# Patient Record
Sex: MALE | Race: WHITE | NOT HISPANIC OR LATINO | Employment: UNEMPLOYED | URBAN - METROPOLITAN AREA
[De-identification: names, ages, dates, MRNs, and addresses within clinical notes are randomized per-mention and may not be internally consistent; named-entity substitution may affect disease eponyms.]

---

## 2021-08-13 ENCOUNTER — IMMUNIZATION (OUTPATIENT)
Dept: INTERNAL MEDICINE | Facility: CLINIC | Age: 26
End: 2021-08-13
Payer: OTHER GOVERNMENT

## 2021-08-13 DIAGNOSIS — Z23 NEED FOR VACCINATION: Primary | ICD-10-CM

## 2021-08-13 PROCEDURE — 91300 COVID-19, MRNA, LNP-S, PF, 30 MCG/0.3 ML DOSE VACCINE: ICD-10-PCS | Mod: ,,, | Performed by: INTERNAL MEDICINE

## 2021-08-13 PROCEDURE — 91300 COVID-19, MRNA, LNP-S, PF, 30 MCG/0.3 ML DOSE VACCINE: CPT | Mod: ,,, | Performed by: INTERNAL MEDICINE

## 2021-08-13 PROCEDURE — 0001A COVID-19, MRNA, LNP-S, PF, 30 MCG/0.3 ML DOSE VACCINE: ICD-10-PCS | Mod: CV19,,, | Performed by: INTERNAL MEDICINE

## 2021-08-13 PROCEDURE — 0001A COVID-19, MRNA, LNP-S, PF, 30 MCG/0.3 ML DOSE VACCINE: CPT | Mod: CV19,,, | Performed by: INTERNAL MEDICINE

## 2021-09-10 ENCOUNTER — IMMUNIZATION (OUTPATIENT)
Dept: PRIMARY CARE CLINIC | Facility: CLINIC | Age: 26
End: 2021-09-10

## 2021-09-10 DIAGNOSIS — Z23 NEED FOR VACCINATION: Primary | ICD-10-CM

## 2021-09-23 ENCOUNTER — OFFICE VISIT (OUTPATIENT)
Dept: INTERNAL MEDICINE | Facility: CLINIC | Age: 26
End: 2021-09-23
Payer: COMMERCIAL

## 2021-09-23 DIAGNOSIS — M26.609 TMJ (TEMPOROMANDIBULAR JOINT DISORDER): Primary | ICD-10-CM

## 2021-09-23 PROCEDURE — 99203 OFFICE O/P NEW LOW 30 MIN: CPT | Mod: 95,,, | Performed by: NURSE PRACTITIONER

## 2021-09-23 PROCEDURE — 99203 PR OFFICE/OUTPT VISIT, NEW, LEVL III, 30-44 MIN: ICD-10-PCS | Mod: 95,,, | Performed by: NURSE PRACTITIONER

## 2021-09-23 RX ORDER — METHYLPREDNISOLONE 4 MG/1
TABLET ORAL
Qty: 1 PACKAGE | Refills: 0 | Status: SHIPPED | OUTPATIENT
Start: 2021-09-23 | End: 2021-10-14

## 2021-10-01 ENCOUNTER — OFFICE VISIT (OUTPATIENT)
Dept: PRIMARY CARE CLINIC | Facility: CLINIC | Age: 26
End: 2021-10-01
Payer: COMMERCIAL

## 2021-10-01 DIAGNOSIS — S03.00XD DISLOCATION OF TEMPOROMANDIBULAR JOINT, SUBSEQUENT ENCOUNTER: Primary | ICD-10-CM

## 2021-10-01 PROBLEM — G44.201: Status: ACTIVE | Noted: 2021-10-01

## 2021-10-01 PROBLEM — G47.30 SLEEP APNEA: Status: ACTIVE | Noted: 2021-03-03

## 2021-10-01 PROBLEM — J30.9 ALLERGIC RHINITIS: Status: ACTIVE | Noted: 2021-10-01

## 2021-10-01 PROBLEM — R51.9 HEADACHE: Status: ACTIVE | Noted: 2021-10-01

## 2021-10-01 PROCEDURE — 99213 OFFICE O/P EST LOW 20 MIN: CPT | Mod: 95,,, | Performed by: STUDENT IN AN ORGANIZED HEALTH CARE EDUCATION/TRAINING PROGRAM

## 2021-10-01 PROCEDURE — 99213 PR OFFICE/OUTPT VISIT, EST, LEVL III, 20-29 MIN: ICD-10-PCS | Mod: 95,,, | Performed by: STUDENT IN AN ORGANIZED HEALTH CARE EDUCATION/TRAINING PROGRAM

## 2021-10-01 RX ORDER — CYCLOBENZAPRINE HCL 10 MG
10 TABLET ORAL NIGHTLY
Qty: 14 TABLET | Refills: 0 | Status: SHIPPED | OUTPATIENT
Start: 2021-10-01 | End: 2022-05-11

## 2021-10-01 RX ORDER — FLAXSEED OIL 1000 MG
1 CAPSULE ORAL DAILY
COMMUNITY

## 2021-10-01 RX ORDER — NAPROXEN 500 MG/1
500 TABLET ORAL 2 TIMES DAILY
Qty: 28 TABLET | Refills: 0 | Status: SHIPPED | OUTPATIENT
Start: 2021-10-01

## 2021-10-01 RX ORDER — LANOLIN ALCOHOL/MO/W.PET/CERES
1 CREAM (GRAM) TOPICAL DAILY
COMMUNITY
Start: 2020-12-28 | End: 2021-12-28

## 2021-12-28 ENCOUNTER — TELEPHONE (OUTPATIENT)
Dept: PRIMARY CARE CLINIC | Facility: CLINIC | Age: 26
End: 2021-12-28
Payer: COMMERCIAL

## 2021-12-28 NOTE — TELEPHONE ENCOUNTER
----- Message from Faith Sam sent at 12/28/2021 10:55 AM CST -----  Contact: [phone] Clay  Patient tested positive for covid on Monday and would like to know the next steps. Patient is experiencing symptoms. Please call and advise.

## 2021-12-28 NOTE — TELEPHONE ENCOUNTER
Pt was having headaches, leg cramps, and lower back pain, nausea, and vomiting. States today he is feeling a lot better, no headaches, but still having body aches. States he has been able to hold liquids down and food. No SOB. Pt instructed to rest, drink fluids, and take OTC medications for symptoms. Pt instructed to go to ER for new or worsening symptoms, stated understanding

## 2022-03-10 ENCOUNTER — TELEPHONE (OUTPATIENT)
Dept: PRIMARY CARE CLINIC | Facility: CLINIC | Age: 27
End: 2022-03-10
Payer: COMMERCIAL

## 2022-03-18 ENCOUNTER — IMMUNIZATION (OUTPATIENT)
Dept: INTERNAL MEDICINE | Facility: CLINIC | Age: 27
End: 2022-03-18
Payer: COMMERCIAL

## 2022-03-18 DIAGNOSIS — Z23 NEED FOR VACCINATION: Primary | ICD-10-CM

## 2022-03-18 PROCEDURE — 91305 COVID-19, MRNA, LNP-S, PF, 30 MCG/0.3 ML DOSE VACCINE (PFIZER): CPT | Mod: PBBFAC

## 2022-04-22 ENCOUNTER — OFFICE VISIT (OUTPATIENT)
Dept: PRIMARY CARE CLINIC | Facility: CLINIC | Age: 27
End: 2022-04-22
Payer: COMMERCIAL

## 2022-04-22 DIAGNOSIS — Z91.199 NO-SHOW FOR APPOINTMENT: Primary | ICD-10-CM

## 2022-04-22 PROCEDURE — 99499 UNLISTED E&M SERVICE: CPT | Mod: 95,,, | Performed by: STUDENT IN AN ORGANIZED HEALTH CARE EDUCATION/TRAINING PROGRAM

## 2022-04-22 PROCEDURE — 99499 NO LOS: ICD-10-PCS | Mod: 95,,, | Performed by: STUDENT IN AN ORGANIZED HEALTH CARE EDUCATION/TRAINING PROGRAM

## 2022-04-22 NOTE — PROGRESS NOTES
Appears patient logged into appt 1 hour late.  (checked in at 5:30 for a 4:30 appt)    Patient was not seen for his appt and provider was done seeing patients for the day.    - SB

## 2022-04-29 ENCOUNTER — OFFICE VISIT (OUTPATIENT)
Dept: PRIMARY CARE CLINIC | Facility: CLINIC | Age: 27
End: 2022-04-29
Payer: COMMERCIAL

## 2022-04-29 DIAGNOSIS — J34.2 NASAL SEPTAL DEVIATION: ICD-10-CM

## 2022-04-29 DIAGNOSIS — R09.81 NOSE CONGESTION: Primary | ICD-10-CM

## 2022-04-29 PROCEDURE — 99213 OFFICE O/P EST LOW 20 MIN: CPT | Mod: 95,,, | Performed by: STUDENT IN AN ORGANIZED HEALTH CARE EDUCATION/TRAINING PROGRAM

## 2022-04-29 PROCEDURE — 99213 PR OFFICE/OUTPT VISIT, EST, LEVL III, 20-29 MIN: ICD-10-PCS | Mod: 95,,, | Performed by: STUDENT IN AN ORGANIZED HEALTH CARE EDUCATION/TRAINING PROGRAM

## 2022-04-29 RX ORDER — LANOLIN ALCOHOL/MO/W.PET/CERES
2 CREAM (GRAM) TOPICAL DAILY
COMMUNITY
Start: 2021-05-28

## 2022-04-29 RX ORDER — LORATADINE 10 MG/1
TABLET ORAL
COMMUNITY
Start: 2021-08-02

## 2022-04-29 NOTE — PROGRESS NOTES
The patient location is: [place]  The chief complaint leading to consultation is: sinusitis    Visit type: audiovisual    Face to Face time with patient: 15  15 minutes of total time spent on the encounter, which includes face to face time and non-face to face time preparing to see the patient (eg, review of tests), Obtaining and/or reviewing separately obtained history, Documenting clinical information in the electronic or other health record, Independently interpreting results (not separately reported) and communicating results to the patient/family/caregiver, or Care coordination (not separately reported).         Each patient to whom he or she provides medical services by telemedicine is:  (1) informed of the relationship between the physician and patient and the respective role of any other health care provider with respect to management of the patient; and (2) notified that he or she may decline to receive medical services by telemedicine and may withdraw from such care at any time.    Notes:     Has been having significant allergies, is interested in a referral to ENT.  Has tried multiple anti-histamines, nasal rinses and Flonase.  Not getting relief.    Right nare more obstructed, cannot even irrigate that side.  States has been working in [place] and [place] and both areas having high pollen that is affecting him.      Has a CPAP but is not able to tolerate this due to congestion.     No fever or chills. No cough.  No mucous.  No N/V.  No ear pain.     Physical Exam  Constitutional:       General: He is not in acute distress.     Appearance: Normal appearance. He is not ill-appearing.   HENT:      Head: Normocephalic and atraumatic.      Right Ear: External ear normal.      Left Ear: External ear normal.   Eyes:      Extraocular Movements: Extraocular movements intact.   Pulmonary:      Effort: No respiratory distress.   Neurological:      Mental Status: He is alert and oriented to person, place, and time.    Psychiatric:         Mood and Affect: Mood normal.         Behavior: Behavior normal.         Right nostril obstruction, congestion:   - continue with anti-histamine and irrigation.   - referral to ENT being placed to eval and consider if needing further intervention.

## 2022-05-06 ENCOUNTER — OFFICE VISIT (OUTPATIENT)
Dept: OTOLARYNGOLOGY | Facility: CLINIC | Age: 27
End: 2022-05-06
Payer: COMMERCIAL

## 2022-05-06 VITALS — TEMPERATURE: 98 F | HEART RATE: 76 BPM | SYSTOLIC BLOOD PRESSURE: 137 MMHG | DIASTOLIC BLOOD PRESSURE: 89 MMHG

## 2022-05-06 DIAGNOSIS — J34.2 NASAL SEPTAL DEVIATION: ICD-10-CM

## 2022-05-06 DIAGNOSIS — J32.9 CHRONIC SINUSITIS, UNSPECIFIED LOCATION: ICD-10-CM

## 2022-05-06 DIAGNOSIS — M95.0 NASAL VALVE COLLAPSE: ICD-10-CM

## 2022-05-06 DIAGNOSIS — G47.33 OSA (OBSTRUCTIVE SLEEP APNEA): ICD-10-CM

## 2022-05-06 DIAGNOSIS — J30.89 NON-SEASONAL ALLERGIC RHINITIS, UNSPECIFIED TRIGGER: Primary | ICD-10-CM

## 2022-05-06 DIAGNOSIS — R09.81 NOSE CONGESTION: ICD-10-CM

## 2022-05-06 DIAGNOSIS — J30.89 NON-SEASONAL ALLERGIC RHINITIS, UNSPECIFIED TRIGGER: ICD-10-CM

## 2022-05-06 DIAGNOSIS — J32.9 CHRONIC SINUSITIS, UNSPECIFIED LOCATION: Primary | ICD-10-CM

## 2022-05-06 PROCEDURE — 99204 OFFICE O/P NEW MOD 45 MIN: CPT | Mod: 25,S$GLB,, | Performed by: OTOLARYNGOLOGY

## 2022-05-06 PROCEDURE — 31231 NASAL ENDOSCOPY DX: CPT | Mod: S$GLB,,, | Performed by: OTOLARYNGOLOGY

## 2022-05-06 PROCEDURE — 31231 PR NASAL ENDOSCOPY, DX: ICD-10-PCS | Mod: S$GLB,,, | Performed by: OTOLARYNGOLOGY

## 2022-05-06 PROCEDURE — 99204 PR OFFICE/OUTPT VISIT, NEW, LEVL IV, 45-59 MIN: ICD-10-PCS | Mod: 25,S$GLB,, | Performed by: OTOLARYNGOLOGY

## 2022-05-06 RX ORDER — FLUTICASONE PROPIONATE 50 MCG
1 SPRAY, SUSPENSION (ML) NASAL 2 TIMES DAILY
Qty: 16 G | Refills: 11 | Status: SHIPPED | OUTPATIENT
Start: 2022-05-06 | End: 2024-04-27

## 2022-05-06 RX ORDER — DOXYCYCLINE 100 MG/1
100 CAPSULE ORAL 2 TIMES DAILY
Qty: 28 CAPSULE | Refills: 0 | Status: SHIPPED | OUTPATIENT
Start: 2022-05-06 | End: 2022-05-20

## 2022-05-06 RX ORDER — AZELASTINE 1 MG/ML
1 SPRAY, METERED NASAL 2 TIMES DAILY
Qty: 30 ML | Refills: 3 | Status: SHIPPED | OUTPATIENT
Start: 2022-05-06 | End: 2022-06-05

## 2022-05-06 RX ORDER — LEVOCETIRIZINE DIHYDROCHLORIDE 5 MG/1
5 TABLET, FILM COATED ORAL NIGHTLY
Qty: 30 TABLET | Refills: 11 | Status: SHIPPED | OUTPATIENT
Start: 2022-05-06 | End: 2023-03-12

## 2022-05-06 RX ORDER — AZELASTINE 1 MG/ML
1 SPRAY, METERED NASAL 2 TIMES DAILY
Qty: 30 ML | Refills: 11 | Status: SHIPPED | OUTPATIENT
Start: 2022-05-06 | End: 2025-12-10

## 2022-05-06 NOTE — PROGRESS NOTES
Subjective:     Chief Complaint:   Chief Complaint   Patient presents with    Sinusitis    Sinus Problem       Clay Rosales is a 27 y.o. male who was referred to me by Dr. David Kelley in consultation for nasal congestion.    Patient reports onset of symptoms occurred several years ago.  Symptoms include Facial pain/pressure, Clear rhinorrhea, Nasal congestion , PND.  He reports symptoms have been fluctuating a bit. Right side is worse than the left. Trauma several years ago. Reports perennial allergy symptoms. Nasal congestion, rhinorrhea, sneezing, itchy eyes. Has used flonase a few times, claritin, and sinus rinse (navage) but difficulty in right side due to obstruction. Allergy symptoms do seem worse in the spring.   He does not have a history of asthma.      He denies a history of reflux symptoms. Patient has not previously had an EGD.     He reports a diagnosis of obstructive sleep apnea. Unable to tolerate CPAP due to nasal obstruction.      There is not a prior history of sinonasal surgery.       SNOT Questionnaire Total Score 5/5/2022   SNOT-22 score: 53     NOSE score:: (P) 75%      Past Medical History  He has no past medical history on file.    Past Surgical History  He has no past surgical history on file.    Family History  His family history is not on file.    Social History  He reports that he has never smoked. He has never used smokeless tobacco.    Allergies  He has No Known Allergies.    Medications  He has a current medication list which includes the following prescription(s): ascorbic acid (vitamin c), flaxseed oil, loratadine, magnesium oxide, naproxen, azelastine, azelastine, cyclobenzaprine, doxycycline, fluticasone propionate, and levocetirizine.    Review of Systems     Constitutional: Negative for appetite change, chills, fatigue, fever and unexpected weight loss.      HENT: Positive for postnasal drip and sinus pressure.      Eyes:  Negative for change in eyesight, eye  drainage, eye itching and photophobia.     Respiratory:  Positive for sleep apnea and snoring.      Cardiovascular:  Negative for chest pain, foot swelling, irregular heartbeat and swollen veins.     Gastrointestinal:  Negative for abdominal pain, acid reflux, constipation, diarrhea, heartburn and vomiting.     Genitourinary: Negative for difficulty urinating, sexual problems and frequent urination.     Musc: Negative for aching joints, aching muscles, back pain and neck pain.     Skin: Negative for rash.     Allergy: Positive for seasonal allergies.     Endocrine: Negative for cold intolerance and heat intolerance.      Neurological: Positive for headaches.     Hematologic: Negative for bruises/bleeds easily and swollen glands.      Psychiatric: Positive for sleep disturbance.              Objective:     /89 (BP Location: Right arm, Patient Position: Sitting, BP Method: Medium (Automatic))   Pulse 76   Temp 97.9 °F (36.6 °C) (Temporal)      Constitutional:   Vital signs are normal. He appears well-developed and well-nourished. Normal speech.      Head:  Normocephalic and atraumatic.     Ears:  Right ear hearing normal to normal and whispered voice; external ear normal without scars, lesions, or masses; ear canal, tympanic membrane, and middle ear normal. and left ear hearing normal to normal and whispered voice; external ear normal without scars, lesions, or masses; ear canal, tympanic membrane, and middle ear normal..     Nose:  Mucosal edema, rhinorrhea, sinus tenderness and septal deviation (right superior, left inf) present. No nasal septal hematoma. No epistaxis. Turbinates abnormal.  Right sinus exhibits maxillary sinus tenderness. Left sinus exhibits maxillary sinus tenderness.         Mouth/Throat  Lips, teeth, and gums normal.     Neck:  Thyroid normal, trachea normal, phonation normal and no adenopathy.     Pulmonary/Chest:   No apnea, no tachypnea and no bradypnea. No respiratory distress.      Psychiatric:   He has a normal mood and affect. His speech is normal and behavior is normal.     Neurological:   He has neurological normal, alert and oriented.       Procedure    Nasal endoscopy performed.  See procedure note.    Procedure: NASAL ENDOSCOPY   Surgeon:  Josesito Arreguin M.D.  Indication for Procedure:  The patient's diagnostic workup requires a full evaluation of the sinonasal regions, which were not visualized on anterior rhinoscopy.  Anesthesia:  Topical 1% phenylephrine/ 4% Lidocaine    Details of Procedure:  After adequate anesthesia had been achieved, the nasal endoscope was inserted into the left nare.  Using a 3 pass technique sequential examination was performed of the nasal cavity, septum, turbinates, osteomeatal complex, sphenoethmoidal recess, choana, and nasopharynx was performed.  The scope was removed and an identical procedure was performed on the right. The patient tolerated the procedure well, without apparent complications.  Detailed findings are listed below.    Findings:    Septum:  obstructive deviation on the right   no vomerian spur       Polyps:  significant middle meatus edema bilaterallyarising from the middle meatus   Middle turbinate:  moderate edema   mild erythema   Middle meatus:  edematous OMC bilaterally   no exudate bilaterally     SE recess:  edematous SE recess bilaterally   Nasopharynx:  minimal adenoids   no edema   no erythema   no exudate   Eustachian tubes:  normal ET bilaterally     The patient tolerated the procedure well.      Data Reviewed    No results found for: WBC  No results found for: EOSINOPHIL  No results found for: EOS  No results found for: PLT  No results found for: GLU  No results found for: IGE    No sinus imaging available.      Assessment:     1. Chronic sinusitis, unspecified location    2. Nasal septal deviation    3. Nasal valve collapse    4. Non-seasonal allergic rhinitis, unspecified trigger    5. Nose congestion    6. JOEL (obstructive  sleep apnea)          Plan:     I had a long discussion with the patient regarding his condition and the further workup and management options.  He has symptoms and evidence of chronic sinusitis on nasal endoscopy today. He also has significant nasal obstruction related to chronic sinusitis/rhinitis, deviated septum, nasal valve collapse and inferior turbinate hypertrophy. Discussed treatment of chronic sinusitis and allergic rhinitis with doxy x 2 weeks, Flonase/asteline BID, sinus irrigation, and xyzal nightly. Will plan for post-treatment CT of the sinuses and re-evaluation of nasal airway. All questions answered and patient encouraged to call with any questions or concerns.

## 2022-05-25 ENCOUNTER — TELEPHONE (OUTPATIENT)
Dept: OTOLARYNGOLOGY | Facility: CLINIC | Age: 27
End: 2022-05-25
Payer: COMMERCIAL

## 2022-05-25 NOTE — TELEPHONE ENCOUNTER
Called and spoke with the patient.  We discussed about his CT scan being pushed back because they needed to see that he had tried the rinses and Flonase.  So he understood he needed to continue those but once the antibiotics were complete, that would be the only one he did not continue.  He asked if he had to pay out of pocket for the CT scan why did it have to be approved.  I explained that even though the cost may not have been met for his deductible, it still has to be approved by insurance.  If he needed surgery it still needs to show that every measure has been tried to get approval.  He states he finally understood.  We made an appt so that he could follow up with Dr. Arreguin before his scheduled CT scan.  I explained the appt he had for tomorrrow as to review the CT scan but we could make a virtual appt so that Dr. Arreguin can find out how things are going for him with the use of the rinses and Flonase.  Voiced understanding.

## 2022-06-01 ENCOUNTER — OFFICE VISIT (OUTPATIENT)
Dept: OTOLARYNGOLOGY | Facility: CLINIC | Age: 27
End: 2022-06-01
Payer: COMMERCIAL

## 2022-06-01 ENCOUNTER — TELEPHONE (OUTPATIENT)
Dept: UROLOGY | Facility: CLINIC | Age: 27
End: 2022-06-01
Payer: COMMERCIAL

## 2022-06-01 DIAGNOSIS — M95.0 NASAL VALVE COLLAPSE: ICD-10-CM

## 2022-06-01 DIAGNOSIS — J32.9 CHRONIC SINUSITIS, UNSPECIFIED LOCATION: Primary | ICD-10-CM

## 2022-06-01 DIAGNOSIS — G47.33 OSA (OBSTRUCTIVE SLEEP APNEA): ICD-10-CM

## 2022-06-01 DIAGNOSIS — J34.2 NASAL SEPTAL DEVIATION: ICD-10-CM

## 2022-06-01 DIAGNOSIS — J30.89 NON-SEASONAL ALLERGIC RHINITIS, UNSPECIFIED TRIGGER: ICD-10-CM

## 2022-06-01 PROCEDURE — 99213 PR OFFICE/OUTPT VISIT, EST, LEVL III, 20-29 MIN: ICD-10-PCS | Mod: 95,,, | Performed by: OTOLARYNGOLOGY

## 2022-06-01 PROCEDURE — 99213 OFFICE O/P EST LOW 20 MIN: CPT | Mod: 95,,, | Performed by: OTOLARYNGOLOGY

## 2022-06-01 NOTE — TELEPHONE ENCOUNTER
"Spoke with patient on the phone. Rescheduled for Tuesday, 6/7/22 at 9 am with CRISTINA Sanon. States "I'll bring in the piece of a kidney stone that I passed." No further questions at this time.    ----- Message from Justin Woods MD sent at 6/1/2022  8:33 AM CDT -----  Regarding: RE: MRN: [id number]  Contact: pt  That's fine. He can also see Jumana, who likely has better availability.    ----- Message -----  From: Marquita Gastelum RN  Sent: 5/31/2022   3:56 PM CDT  To: Justin Woods MD  Subject: MRN: [id number]                                     Good afternoon, Dr. Woods,    Would it be okay to offer this patient one of your F/U slots this Friday, 6/3/22 at [facility]?    Thank you, Marquita     ----- Message -----  From: Lakisha Simmons LPN  Sent: 5/31/2022   3:43 PM CDT  To: Chuck Rosales, Clay WHELAN    Male, 27 y.o., 1995    MRN:   [id number]    ----- Message -----  From: Jessica Oswald  Sent: 5/31/2022   1:33 PM CDT  To: [facility] Clinical Staff    Pt has kidney stone and wants to see if it was possible to seen earlier than the 22 of June. Pt works out of town Mon-Fri but will fly in if sn sooner appt is available..    Confirmed contact info below:  Contact Name: Clay Rosales  Phone Number: [phone]                        "

## 2022-06-01 NOTE — PROGRESS NOTES
Subjective:     Chief Complaint:   No chief complaint on file.      Clay Rosales is a 27 y.o. male who was referred to me by Dr. David Kelley in consultation for nasal congestion.    Patient reports onset of symptoms occurred several years ago.  Symptoms include Facial pain/pressure, Clear rhinorrhea, Nasal congestion , PND.  He reports symptoms have been fluctuating a bit. Right side is worse than the left. Trauma several years ago. Reports perennial allergy symptoms. Nasal congestion, rhinorrhea, sneezing, itchy eyes. Has used flonase a few times, claritin, and sinus rinse (navage) but difficulty in right side due to obstruction. Allergy symptoms do seem worse in the spring.   He does not have a history of asthma.      He denies a history of reflux symptoms. Patient has not previously had an EGD.     He reports a diagnosis of obstructive sleep apnea. Unable to tolerate CPAP due to nasal obstruction.      There is not a prior history of sinonasal surgery.       SNOT Questionnaire Total Score 5/5/2022   SNOT-22 score: 53      %    Today (6/1/22): Patient returns for virtual follow-up visit.  Does report some improvement of his facial pain and pressure on breathing.  He still has intermittent pressure and nasal obstruction on the right side.  He completed his doxycycline for 2 weeks and is continuing to use the sinus rinse along with Astelin and Flonase twice a day and oral antihistamine nightly.    Past Medical History  He has no past medical history on file.    Past Surgical History  He has no past surgical history on file.    Family History  His family history is not on file.    Social History  He reports that he has never smoked. He has never used smokeless tobacco.    Allergies  He has No Known Allergies.    Medications  He has a current medication list which includes the following prescription(s): ascorbic acid (vitamin c), azelastine, azelastine, flaxseed oil, fluticasone propionate,  hydrocodone-acetaminophen, levocetirizine, loratadine, magnesium oxide, naproxen, and tamsulosin.    Review of Systems     Constitutional: Negative for appetite change, chills, fatigue, fever and unexpected weight loss.      HENT: Positive for hearing loss, postnasal drip and sinus pressure.      Eyes:  Negative for change in eyesight, eye drainage, eye itching and photophobia.     Respiratory:  Positive for sleep apnea and snoring.      Cardiovascular:  Negative for chest pain, foot swelling, irregular heartbeat and swollen veins.     Gastrointestinal:  Negative for abdominal pain, acid reflux, constipation, diarrhea, heartburn and vomiting.     Genitourinary: Negative for difficulty urinating, sexual problems and frequent urination.     Musc: Negative for aching joints, aching muscles, back pain and neck pain.     Skin: Negative for rash.     Allergy: Positive for seasonal allergies.     Endocrine: Negative for cold intolerance and heat intolerance.      Neurological: Positive for headaches.     Hematologic: Negative for bruises/bleeds easily and swollen glands.      Psychiatric: Positive for sleep disturbance.              Objective:     There were no vitals taken for this visit.     Constitutional:   Vital signs are normal. He appears well-developed and well-nourished. Normal speech.      Head:  Normocephalic and atraumatic.     Ears:  Right ear hearing normal to normal and whispered voice; external ear normal without scars, lesions, or masses; ear canal, tympanic membrane, and middle ear normal. and left ear hearing normal to normal and whispered voice; external ear normal without scars, lesions, or masses; ear canal, tympanic membrane, and middle ear normal..     Nose:  Mucosal edema, rhinorrhea, sinus tenderness and septal deviation (right superior, left inf) present. No nasal septal hematoma. No epistaxis. Turbinates abnormal.  Right sinus exhibits maxillary sinus tenderness. Left sinus exhibits maxillary  sinus tenderness.         Mouth/Throat  Lips, teeth, and gums normal.     Neck:  Thyroid normal, trachea normal, phonation normal and no adenopathy.     Pulmonary/Chest:   No apnea, no tachypnea and no bradypnea. No respiratory distress.     Psychiatric:   He has a normal mood and affect. His speech is normal and behavior is normal.     Neurological:   He has neurological normal, alert and oriented.       Procedure    Previous scope findings   Findings:    Septum:  obstructive deviation on the right   no vomerian spur       Polyps:  significant middle meatus edema bilaterallyarising from the middle meatus   Middle turbinate:  moderate edema   mild erythema   Middle meatus:  edematous OMC bilaterally   no exudate bilaterally     SE recess:  edematous SE recess bilaterally   Nasopharynx:  minimal adenoids   no edema   no erythema   no exudate   Eustachian tubes:  normal ET bilaterally     The patient tolerated the procedure well.      Data Reviewed    WBC (K/uL)   Date Value   05/30/2022 4.87     Eosinophil % (%)   Date Value   05/30/2022 4.3     Eos # (K/uL)   Date Value   05/30/2022 0.2     Platelets (K/uL)   Date Value   05/30/2022 182     Glucose (mg/dL)   Date Value   05/30/2022 110     No results found for: IGE    No sinus imaging available.      Assessment:     1. Chronic sinusitis, unspecified location    2. Nasal septal deviation    3. Non-seasonal allergic rhinitis, unspecified trigger    4. Nasal valve collapse    5. JOEL (obstructive sleep apnea)          Plan:     I had a long discussion with the patient regarding his condition and the further workup and management options.  He has symptoms and evidence of chronic sinusitis on previous nasal endoscopy. He also has significant nasal obstruction related to chronic sinusitis/rhinitis, deviated septum, nasal valve collapse and inferior turbinate hypertrophy.  He has been on maximal medical therapy for chronic sinusitis and allergic rhinitis with doxy x 2  weeks, Flonase/asteline BID, sinus irrigation, and xyzal nightly.  He still has some sinus symptoms.  We will evaluate the site of his with a CT scan.  I will contact him regarding the scan results.  All questions answered and patient encouraged to call with any questions or concerns.

## 2022-06-06 NOTE — PROGRESS NOTES
"Subjective:      Clay Rosales is a 27 y.o. male who was self-referred for evaluation of renal stones.      He presented to the emergency department on 05/30/2022 with complaint of acute onset left flank pain with radiation to groin and testicles.  UA revealed 3+ occult blood; no urine culture obtained.  CT renal stone study showed 5 mm left nonobstructing renal stone with no evidence of hydronephrosis.  He was discharged with met and subsequently passed a small stone that he was able to collect.  This was his 1st renal stone episode.  Since passing stone he reports full resolution of flank pain, groin pain.  He does report some mild dysuria; denies hematuria. Denies f/c/n/v.     The following portions of the patient's history were reviewed and updated as appropriate: allergies, current medications, past family history, past medical history, past social history, past surgical history and problem list.    Review of Systems  Constitutional: no fever or chills  ENT: no nasal congestion or sore throat  Respiratory: no cough or shortness of breath  Cardiovascular: no chest pain or palpitations  Gastrointestinal: no nausea or vomiting, tolerating diet  Genitourinary: as per HPI  Hematologic/Lymphatic: no easy bruising or lymphadenopathy  Musculoskeletal: no arthralgias or myalgias  Neurological: no seizures or tremors  Behavioral/Psych: no auditory or visual hallucinations     Objective:   Vitals: /88 (BP Location: Left arm, Patient Position: Sitting, BP Method: Small (Automatic))   Pulse 66   Resp 16   Ht 5' 7" (1.702 m)   Wt 87.2 kg (192 lb 3.9 oz)   BMI 30.11 kg/m²     Physical Exam   General: alert and oriented, no acute distress  Head: normocephalic, atraumatic  Neck: supple, no lymphadenopathy, normal ROM, no masses  Respiratory: Symmetric expansion, non-labored breathing  Cardiovascular: regular rate and rhythm  Abdomen: soft, non tender, non distended  Skin: normal coloration and turgor, no rashes, " no suspicious skin lesions noted  Neuro: alert and oriented x3, no gross deficits  Psych: normal judgment and insight, normal mood/affect and non-anxious    Physical Exam    Lab Review   Urinalysis demonstrates no specimen   Lab Results   Component Value Date    WBC 4.87 05/30/2022    HGB 15.1 05/30/2022    HCT 44.0 05/30/2022    MCV 92 05/30/2022     05/30/2022     Lab Results   Component Value Date    CREATININE 1.0 05/30/2022    BUN 20 05/30/2022     No results found for: PSA  Imaging  CT RENAL STONE STUDY ABD PELVIS WO     CLINICAL HISTORY:  Flank pain, kidney stone suspected;     TECHNIQUE:  Low dose axial images, sagittal and coronal reformations were obtained from the lung bases to the pubic symphysis.  Contrast was not administered.     COMPARISON:  None     FINDINGS:  Lung Bases: Unremarkable.     Kidneys/ Ureters: 5 mm nonobstructing stone left kidney.  No ureteral stones or hydronephrosis on either side.     Liver: Enlarged and fatty infiltrated.     Gallbladder: No calcified gallstones.     Bile Ducts: No evidence of dilated ducts.     Pancreas: No mass or peripancreatic fat stranding.     Spleen: Unremarkable     Adrenals: Unremarkable.     Pelvic organs: Unremarkable.     GI Tract/Mesentery: No evidence of bowel obstruction or inflammation.     Retroperitoneum: No significant adenopathy.     Vasculature: No significant atherosclerosis or aneurysm.     Bones: Small area of osteonecrosis involving the posterior weight-bearing aspect of the right femoral head, otherwise unremarkable.     Impression:     Nonobstructing left nephrolithiasis        Electronically signed by: Davian Armas Jr  Date:                                            05/30/2022  Time:                                           10:03      Assessment and Plan:   1. Nephrolithiasis  --We discussed how to prevent future stones:   --Eat fewer high-oxalate foods (spinach, bran flakes, rhubarb, beets, potato chips, french fries,  nuts and nut butters)   --Eat calcium rich foods   --Limit the vitamin C content of your diet. Do not take more than 500 mg of Vitamin C daily.   --It is very important to drink plenty of liquids. Your goal should be 10-12 glasses a day. At least 5-6 glasses should be water.    --Reduce sodium intake: 2-3 grams per day. Limit eating processed foods such as hot dogs, deli meats, sausage, canned products, dry soup mixes, sauerkraut, pickles, and various convenience mixes.   --Add citrus to diet i.e. lemonade, limes, orange juice.   --Will send stone for analysis   --will obtain RBUS in 1 month 2/2 to recent stone episode  --will monitor 5mm left renal stone with KUB; discussed URS vs ESWL      2. Dysuria  - Urine culture  --will notify with results    --rtc after imaging     This note is dictated on M*Modal word recognition program.  There are word recognition mistakes that are occasionally missed on review.

## 2022-06-07 ENCOUNTER — OFFICE VISIT (OUTPATIENT)
Dept: UROLOGY | Facility: CLINIC | Age: 27
End: 2022-06-07
Payer: COMMERCIAL

## 2022-06-07 VITALS
BODY MASS INDEX: 30.17 KG/M2 | DIASTOLIC BLOOD PRESSURE: 88 MMHG | RESPIRATION RATE: 16 BRPM | HEIGHT: 67 IN | WEIGHT: 192.25 LBS | HEART RATE: 66 BPM | SYSTOLIC BLOOD PRESSURE: 132 MMHG

## 2022-06-07 DIAGNOSIS — N20.0 NEPHROLITHIASIS: Primary | ICD-10-CM

## 2022-06-07 DIAGNOSIS — R30.0 DYSURIA: ICD-10-CM

## 2022-06-07 PROCEDURE — 99203 OFFICE O/P NEW LOW 30 MIN: CPT | Mod: S$GLB,,, | Performed by: NURSE PRACTITIONER

## 2022-06-07 PROCEDURE — 99999 PR PBB SHADOW E&M-EST. PATIENT-LVL V: ICD-10-PCS | Mod: PBBFAC,,, | Performed by: NURSE PRACTITIONER

## 2022-06-07 PROCEDURE — 82365 CALCULUS SPECTROSCOPY: CPT | Performed by: NURSE PRACTITIONER

## 2022-06-07 PROCEDURE — 99999 PR PBB SHADOW E&M-EST. PATIENT-LVL V: CPT | Mod: PBBFAC,,, | Performed by: NURSE PRACTITIONER

## 2022-06-07 PROCEDURE — 99203 PR OFFICE/OUTPT VISIT, NEW, LEVL III, 30-44 MIN: ICD-10-PCS | Mod: S$GLB,,, | Performed by: NURSE PRACTITIONER

## 2022-06-07 PROCEDURE — 87086 URINE CULTURE/COLONY COUNT: CPT | Performed by: NURSE PRACTITIONER

## 2022-06-08 LAB — BACTERIA UR CULT: NO GROWTH

## 2022-06-11 LAB
COMPN STONE: NORMAL
SPECIMEN SOURCE: NORMAL
STONE ANALYSIS IR-IMP: NORMAL

## 2022-06-24 ENCOUNTER — HOSPITAL ENCOUNTER (OUTPATIENT)
Dept: RADIOLOGY | Facility: OTHER | Age: 27
Discharge: HOME | End: 2022-06-24
Attending: OTOLARYNGOLOGY
Payer: COMMERCIAL

## 2022-06-24 ENCOUNTER — OFFICE VISIT (OUTPATIENT)
Dept: OTOLARYNGOLOGY | Facility: CLINIC | Age: 27
End: 2022-06-24
Payer: COMMERCIAL

## 2022-06-24 VITALS — HEART RATE: 77 BPM | TEMPERATURE: 98 F | SYSTOLIC BLOOD PRESSURE: 142 MMHG | DIASTOLIC BLOOD PRESSURE: 79 MMHG

## 2022-06-24 DIAGNOSIS — J30.89 NON-SEASONAL ALLERGIC RHINITIS, UNSPECIFIED TRIGGER: ICD-10-CM

## 2022-06-24 DIAGNOSIS — J32.9 CHRONIC SINUSITIS, UNSPECIFIED LOCATION: ICD-10-CM

## 2022-06-24 DIAGNOSIS — R09.81 NOSE CONGESTION: ICD-10-CM

## 2022-06-24 DIAGNOSIS — J34.89 CONCHA BULLOSA: ICD-10-CM

## 2022-06-24 DIAGNOSIS — J34.2 NASAL SEPTAL DEVIATION: ICD-10-CM

## 2022-06-24 DIAGNOSIS — J34.2 NASAL SEPTAL DEVIATION: Primary | ICD-10-CM

## 2022-06-24 DIAGNOSIS — G47.33 OSA (OBSTRUCTIVE SLEEP APNEA): ICD-10-CM

## 2022-06-24 DIAGNOSIS — M95.0 NASAL VALVE COLLAPSE: ICD-10-CM

## 2022-06-24 PROCEDURE — 70486 CT MEDTRONIC SINUSES WITHOUT: ICD-10-PCS | Mod: 26,,, | Performed by: RADIOLOGY

## 2022-06-24 PROCEDURE — 99213 PR OFFICE/OUTPT VISIT, EST, LEVL III, 20-29 MIN: ICD-10-PCS | Mod: S$GLB,,, | Performed by: OTOLARYNGOLOGY

## 2022-06-24 PROCEDURE — 70486 CT MAXILLOFACIAL W/O DYE: CPT | Mod: TC

## 2022-06-24 PROCEDURE — 99213 OFFICE O/P EST LOW 20 MIN: CPT | Mod: S$GLB,,, | Performed by: OTOLARYNGOLOGY

## 2022-06-24 PROCEDURE — 70486 CT MAXILLOFACIAL W/O DYE: CPT | Mod: 26,,, | Performed by: RADIOLOGY

## 2022-06-25 NOTE — PROGRESS NOTES
Subjective:     Chief Complaint:   Chief Complaint   Patient presents with    Follow-up     Ct scan results       Clay Rosales is a 27 y.o. male who was referred to me by Dr. David Kelley in consultation for nasal congestion.    Patient reports onset of symptoms occurred several years ago.  Symptoms include Facial pain/pressure, Clear rhinorrhea, Nasal congestion , PND.  He reports symptoms have been fluctuating a bit. Right side is worse than the left. Trauma several years ago. Reports perennial allergy symptoms. Nasal congestion, rhinorrhea, sneezing, itchy eyes. Has used flonase a few times, claritin, and sinus rinse (navage) but difficulty in right side due to obstruction. Allergy symptoms do seem worse in the spring.   He does not have a history of asthma.      He denies a history of reflux symptoms. Patient has not previously had an EGD.     He reports a diagnosis of obstructive sleep apnea. Unable to tolerate CPAP due to nasal obstruction.      There is not a prior history of sinonasal surgery.       SNOT Questionnaire Total Score 5/5/2022   SNOT-22 score: 53      %    (6/1/22): Patient returns for virtual follow-up visit.  Does report some improvement of his facial pain and pressure on breathing.  He still has intermittent pressure and nasal obstruction on the right side.  He completed his doxycycline for 2 weeks and is continuing to use the sinus rinse along with Astelin and Flonase twice a day and oral antihistamine nightly.    Today (06/24/2022): Patient returns for follow up visit. Patient reports significant improvement in nasal breathing.  Also reports resolution of sinus pressure.  He did stop taking the Astelin proximally 2 weeks ago and did notice some increase in his allergy symptoms.  Otherwise he still feeling much improved.      Past Medical History  He has no past medical history on file.    Past Surgical History  He has no past surgical history on file.    Family History  His  family history is not on file.    Social History  He reports that he has never smoked. He has never used smokeless tobacco.    Allergies  He has No Known Allergies.    Medications  He has a current medication list which includes the following prescription(s): ascorbic acid (vitamin c), azelastine, flaxseed oil, fluticasone propionate, levocetirizine, loratadine, magnesium oxide, naproxen, hydrocodone-acetaminophen, and tamsulosin.    Review of Systems     Constitutional: Negative for appetite change, chills, fatigue, fever and unexpected weight loss.      HENT: Positive for hearing loss, postnasal drip and sinus pressure.      Eyes:  Negative for change in eyesight, eye drainage, eye itching and photophobia.     Respiratory:  Positive for sleep apnea and snoring.      Cardiovascular:  Negative for chest pain, foot swelling, irregular heartbeat and swollen veins.     Gastrointestinal:  Negative for abdominal pain, acid reflux, constipation, diarrhea, heartburn and vomiting.     Genitourinary: Negative for difficulty urinating, sexual problems and frequent urination.     Musc: Negative for aching joints, aching muscles, back pain and neck pain.     Skin: Negative for rash.     Allergy: Positive for seasonal allergies.     Endocrine: Negative for cold intolerance and heat intolerance.      Neurological: Positive for headaches.     Hematologic: Negative for bruises/bleeds easily and swollen glands.      Psychiatric: Positive for sleep disturbance.              Objective:     BP (!) 142/79   Pulse 77   Temp 97.7 °F (36.5 °C) (Temporal)      Constitutional:   Vital signs are normal. He appears well-developed and well-nourished. Normal speech.      Head:  Normocephalic and atraumatic.     Ears:  Right ear hearing normal to normal and whispered voice; external ear normal without scars, lesions, or masses; ear canal, tympanic membrane, and middle ear normal. and left ear hearing normal to normal and whispered voice;  external ear normal without scars, lesions, or masses; ear canal, tympanic membrane, and middle ear normal..     Nose:  Mucosal edema (with significant improvement ), rhinorrhea, sinus tenderness and septal deviation (right superior, left inf) present. No nasal septal hematoma. No epistaxis. Turbinate hypertrophy (some improvement ).  Right sinus exhibits no maxillary sinus tenderness and no frontal sinus tenderness. Left sinus exhibits no maxillary sinus tenderness and no frontal sinus tenderness.         Mouth/Throat  Lips, teeth, and gums normal.     Neck:  Thyroid normal, trachea normal, phonation normal and no adenopathy.     Pulmonary/Chest:   No apnea, no tachypnea and no bradypnea. No respiratory distress.     Psychiatric:   He has a normal mood and affect. His speech is normal and behavior is normal.     Neurological:   He has neurological normal, alert and oriented.     Procedure    Previous scope findings   Findings:    Septum:  obstructive deviation on the right   no vomerian spur       Polyps:  significant middle meatus edema bilaterallyarising from the middle meatus   Middle turbinate:  moderate edema   mild erythema   Middle meatus:  edematous OMC bilaterally   no exudate bilaterally     SE recess:  edematous SE recess bilaterally   Nasopharynx:  minimal adenoids   no edema   no erythema   no exudate   Eustachian tubes:  normal ET bilaterally     The patient tolerated the procedure well.      Data Reviewed    WBC (K/uL)   Date Value   05/30/2022 4.87     Eosinophil % (%)   Date Value   05/30/2022 4.3     Eos # (K/uL)   Date Value   05/30/2022 0.2     Platelets (K/uL)   Date Value   05/30/2022 182     Glucose (mg/dL)   Date Value   05/30/2022 110     No results found for: IGE    CT sinus 6/24  Right mucus retention cyst maxillary sinus  Septal deviation   Left kate bullosa   Moderate inferior turbinate hypertrophy    Assessment:     1. Nasal septal deviation    2. Chronic sinusitis, unspecified  location    3. Non-seasonal allergic rhinitis, unspecified trigger    4. Nasal valve collapse    5. Tracie bullosa    6. JOEL (obstructive sleep apnea)          Plan:     I had a long discussion with the patient regarding his condition and the further workup and management options.  Clay does have evidence of maxillary sinus mucous retention cyst however this is not obstructing the ostiomeatal complex.  He also has evidence of deviated nasal septum inferior turbinate hypertrophy and some nasal valve narrowing, however after control of his allergic rhinitis with intranasal steroid spray and antihistamine, he has had significant improvement.  He does notice some restriction is nasal breathing but at this time he would like to continue medical management.  He will contact me if his symptoms progress.  He will plan on following up in approximately 6 months, sooner if needed.  All questions answered patient was encouraged call with any questions or

## 2022-08-23 ENCOUNTER — TELEPHONE (OUTPATIENT)
Dept: OTOLARYNGOLOGY | Facility: CLINIC | Age: 27
End: 2022-08-23
Payer: COMMERCIAL

## 2023-08-22 ENCOUNTER — ON-DEMAND VIRTUAL (OUTPATIENT)
Dept: URGENT CARE | Facility: CLINIC | Age: 28
End: 2023-08-22
Payer: COMMERCIAL

## 2023-08-22 DIAGNOSIS — J06.9 BACTERIAL UPPER RESPIRATORY INFECTION: Primary | ICD-10-CM

## 2023-08-22 DIAGNOSIS — B96.89 BACTERIAL UPPER RESPIRATORY INFECTION: Primary | ICD-10-CM

## 2023-08-22 DIAGNOSIS — H65.111 NON-RECURRENT ACUTE ALLERGIC OTITIS MEDIA OF RIGHT EAR: ICD-10-CM

## 2023-08-22 DIAGNOSIS — R09.81 NASAL CONGESTION: ICD-10-CM

## 2023-08-22 PROCEDURE — 99213 PR OFFICE/OUTPT VISIT, EST, LEVL III, 20-29 MIN: ICD-10-PCS | Mod: 95,,,

## 2023-08-22 PROCEDURE — 99213 OFFICE O/P EST LOW 20 MIN: CPT | Mod: 95,,,

## 2023-08-22 RX ORDER — METHYLPREDNISOLONE 4 MG/1
TABLET ORAL
Qty: 21 EACH | Refills: 0 | Status: SHIPPED | OUTPATIENT
Start: 2023-08-22 | End: 2023-09-12

## 2023-08-22 RX ORDER — AMOXICILLIN 875 MG/1
875 TABLET, FILM COATED ORAL EVERY 12 HOURS
Qty: 20 TABLET | Refills: 0 | Status: SHIPPED | OUTPATIENT
Start: 2023-08-22 | End: 2023-09-01

## 2023-08-22 NOTE — PROGRESS NOTES
Subjective:      Patient ID: Clay Rosales is a 28 y.o. male.    Vitals:  vitals were not taken for this visit.     Chief Complaint: No chief complaint on file.      Visit Type: TELE AUDIOVISUAL    Present with the patient at the time of consultation: TELEMED PRESENT WITH PATIENT: None    History reviewed. No pertinent past medical history.  History reviewed. No pertinent surgical history.  Review of patient's allergies indicates:  No Known Allergies  Current Outpatient Medications on File Prior to Visit   Medication Sig Dispense Refill    ascorbic acid, vitamin C, (VITAMIN C) 100 MG tablet Take 100 mg by mouth.      azelastine (ASTELIN) 137 mcg (0.1 %) nasal spray 1 spray (137 mcg total) by Nasal route 2 (two) times daily. 30 mL 11    flaxseed oiL 1,000 mg Cap Take 1 tablet by mouth once daily.      fluticasone propionate (FLONASE) 50 mcg/actuation nasal spray 1 spray (50 mcg total) by Each Nostril route 2 (two) times a day. 16 g 11    HYDROcodone-acetaminophen (NORCO) 5-325 mg per tablet Take 1 tablet by mouth every 6 (six) hours as needed for Pain. (Patient not taking: No sig reported) 6 tablet 0    HYDROcodone-acetaminophen (NORCO) 7.5-325 mg per tablet Take 1 tablet by mouth every 4 (four) hours as needed for Pain. 14 tablet 0    levocetirizine (XYZAL) 5 MG tablet TAKE 1 TABLET BY MOUTH EVERY DAY IN THE EVENING 90 tablet 3    loratadine (CLARITIN) 10 mg tablet       magnesium oxide (MAG-OX) 400 mg (241.3 mg magnesium) tablet Take 2 tablets by mouth once daily.      naproxen (NAPROSYN) 500 MG tablet Take 1 tablet (500 mg total) by mouth 2 (two) times daily. 28 tablet 0    tamsulosin (FLOMAX) 0.4 mg Cap Take 1 capsule (0.4 mg total) by mouth once daily. until the kidney stone is passed, or complete resolution of pain 7 capsule 0     No current facility-administered medications on file prior to visit.     History reviewed. No pertinent family history.    Medications Ordered                [facility]/pharm[facility] #[id number] -  [place] - [address]   [address] [place] [address]    Telephone: [phone]   Fax: [phone]   Hours: Not open 24 hours                         E-Prescribed (2 of 2)              amoxicillin (AMOXIL) 875 MG tablet    Sig: Take 1 tablet (875 mg total) by mouth every 12 (twelve) hours. for 10 days       Start: 8/22/23     Quantity: 20 tablet Refills: 0                         methylPREDNISolone (MEDROL DOSEPACK) 4 mg tablet    Sig: use as directed       Start: 8/22/23     Quantity: 21 each Refills: 0                           Ohs Peq Odvv Intake    8/22/2023  3:36 PM CDT - Filed by Patient   Describe your reason for todays visit Possible sinus infection   What is your current physical address in the event of a medical emergency? [address], [place] [address]   Are you able to take your vital signs? No   Please attach any relevant images or files          Patient states that he believes he has a sinus infection. Patient states that he is having pain in his right ear. Patient states that the pain is going into the right side of his facial area and he is having pain in his right side teeth area. Patient states that he is having yellow green nasal drainage.         Constitution: Negative.   HENT:  Positive for ear pain, congestion and sinus pressure.    Neck: neck negative.   Cardiovascular: Negative.    Eyes: Negative.    Respiratory: Negative.     Gastrointestinal: Negative.    Endocrine: negative.   Musculoskeletal: Negative.    Skin: Negative.    Allergic/Immunologic: Negative.    Neurological: Negative.    Hematologic/Lymphatic: Negative.    Psychiatric/Behavioral: Negative.          Objective:   The physical exam was conducted virtually.  Physical Exam   Constitutional: He is oriented to person, place, and time.   HENT:   Head: Normocephalic and atraumatic.   Nose: Congestion present.   Eyes: Pupils are equal, round, and reactive to light. Extraocular movement intact   Neck: Neck supple.    Pulmonary/Chest: Effort normal.   Abdominal: Normal appearance.   Musculoskeletal: Normal range of motion.         General: Normal range of motion.   Neurological: no focal deficit. He is alert, oriented to person, place, and time and at baseline.   Skin: Skin is warm.   Psychiatric: His behavior is normal. Mood, judgment and thought content normal.       Assessment:     1. Bacterial upper respiratory infection    2. Nasal congestion    3. Non-recurrent acute allergic otitis media of right ear        Plan:       Bacterial upper respiratory infection  -     amoxicillin (AMOXIL) 875 MG tablet; Take 1 tablet (875 mg total) by mouth every 12 (twelve) hours. for 10 days  Dispense: 20 tablet; Refill: 0  -     methylPREDNISolone (MEDROL DOSEPACK) 4 mg tablet; use as directed  Dispense: 21 each; Refill: 0    Nasal congestion  -     amoxicillin (AMOXIL) 875 MG tablet; Take 1 tablet (875 mg total) by mouth every 12 (twelve) hours. for 10 days  Dispense: 20 tablet; Refill: 0  -     methylPREDNISolone (MEDROL DOSEPACK) 4 mg tablet; use as directed  Dispense: 21 each; Refill: 0    Non-recurrent acute allergic otitis media of right ear  -     amoxicillin (AMOXIL) 875 MG tablet; Take 1 tablet (875 mg total) by mouth every 12 (twelve) hours. for 10 days  Dispense: 20 tablet; Refill: 0  -     methylPREDNISolone (MEDROL DOSEPACK) 4 mg tablet; use as directed  Dispense: 21 each; Refill: 0

## 2024-07-31 ENCOUNTER — PATIENT OUTREACH (OUTPATIENT)
Dept: ADMINISTRATIVE | Facility: HOSPITAL | Age: 29
End: 2024-07-31
Payer: COMMERCIAL

## 2024-07-31 NOTE — PROGRESS NOTES
Health Maintenance Due   Topic Date Due    Hepatitis C Screening  Never done    Lipid Panel  Never done    HIV Screening  Never done    TETANUS VACCINE  06/19/2022    COVID-19 Vaccine (4 - 2023-24 season) 09/01/2023     Immunizations - reviewed and updated   Care Everywhere - triggered  Care Teams - updated   Outreach - [facility] panel list reviewed. Patient due for annual visit with PCP. Patient last seen on 4/29/2022 for a virtual visit. Portal message sent in regards to scheduling

## 2024-09-19 ENCOUNTER — PATIENT MESSAGE (OUTPATIENT)
Dept: PRIMARY CARE CLINIC | Facility: CLINIC | Age: 29
End: 2024-09-19
Payer: COMMERCIAL

## 2025-06-13 ENCOUNTER — PATIENT OUTREACH (OUTPATIENT)
Dept: ADMINISTRATIVE | Facility: HOSPITAL | Age: 30
End: 2025-06-13
Payer: COMMERCIAL